# Patient Record
(demographics unavailable — no encounter records)

---

## 2024-10-15 NOTE — HISTORY OF PRESENT ILLNESS
[Fruit] : fruit [Vegetables] : vegetables [Meat] : meat [Eggs] : eggs [___ stools per day] : [unfilled]  stools per day [Normal] : Normal [Sleeps ___ hours per night] : sleeps [unfilled] hours per night [Brushing teeth twice/d] : brushing teeth twice per day [Yes] : Patient goes to dentist yearly [Premenarche] : premenarche [Playtime (60 min/d)] : playtime 60 min a day [Participates in after-school activities] : participates in after-school activities [< 2 hrs of screen time per day] : less than 2 hrs of screen time per day [Has Friends] : has friends [Has chance to make own decisions] : has chance to make own decisions [Grade ___] : Grade [unfilled] [Adequate social interactions] : adequate social interactions [Adequate behavior] : adequate behavior [Adequate performance] : adequate performance [Adequate attention] : adequate attention [No difficulties with Homework] : no difficulties with homework [Supervised outdoor play] : supervised outdoor play [Parent knows child's friends] : parent knows child's friends [Monitored computer use] : monitored computer use [Up to date] : Up to date [de-identified] : grtandmother [FreeTextEntry9] : dance [FreeTextEntry1] : This is a 9 year F here for routine exam and immunizations . parents deny any recent illnesses or ER visits

## 2024-10-15 NOTE — DISCUSSION/SUMMARY
[Normal Growth] : growth [Normal Development] : development [None] : No known medical problems [No Elimination Concerns] : elimination [No Feeding Concerns] : feeding [No Skin Concerns] : skin [Normal Sleep Pattern] : sleep [School] : school [Development and Mental Health] : development and mental health [Nutrition and Physical Activity] : nutrition and physical activity [Oral Health] : oral health [Safety] : safety [No Medications] : ~He/She~ is not on any medications [Patient] : patient [Full Activity without restrictions including Physical Education & Athletics] : Full Activity without restrictions including Physical Education & Athletics [FreeTextEntry1] : This is a  9 year  child  here for a routine examination and  immunizations.. The Child shows  growth and development from  previous exam. The  physical exam is within normal limits   she has Howard 3pubic and axillary hair .mom reach puberty at 9 years of age.  Mom states that she is 5 feet and dad is 6 '1"  3   There is minimal breast budding noted. Child has grown 1 1/2 inches since last year  will obtain bone age .  To discuss results with parents once available and as to whether or not further evaluation is indicated.   The patient  does well both academically and socially as per the  parent .  She had been dealing with social anxiety at school with bullying appears to be doing much better and has changed schools.   Advice was given on how to maintain a good healthy diet . Patient was encouraged to Continue physical activity for 1 hour a day and to limit screen time to less than 2 hrs. per day  . Immunizations were discussed and child is up to date with  her vaccinations as listed she did receive however flu vaccine today.. Patient to follow up in 1 year for routine exam and immunizations

## 2024-10-15 NOTE — PHYSICAL EXAM
[Alert] : alert [No Acute Distress] : no acute distress [Normocephalic] : normocephalic [Conjunctivae with no discharge] : conjunctivae with no discharge [PERRL] : PERRL [EOMI Bilateral] : EOMI bilateral [Auricles Well Formed] : auricles well formed [Clear Tympanic membranes with present light reflex and bony landmarks] : clear tympanic membranes with present light reflex and bony landmarks [No Discharge] : no discharge [Nares Patent] : nares patent [Pink Nasal Mucosa] : pink nasal mucosa [Palate Intact] : palate intact [Nonerythematous Oropharynx] : nonerythematous oropharynx [Supple, full passive range of motion] : supple, full passive range of motion [No Palpable Masses] : no palpable masses [Symmetric Chest Rise] : symmetric chest rise [Clear to Auscultation Bilaterally] : clear to auscultation bilaterally [Regular Rate and Rhythm] : regular rate and rhythm [Normal S1, S2 present] : normal S1, S2 present [No Murmurs] : no murmurs [+2 Femoral Pulses] : +2 femoral pulses [Soft] : soft [NonTender] : non tender [Non Distended] : non distended [Normoactive Bowel Sounds] : normoactive bowel sounds [No Hepatomegaly] : no hepatomegaly [No Splenomegaly] : no splenomegaly [Howard: _____] : Howard [unfilled] [Patent] : patent [No fissures] : no fissures [No Abnormal Lymph Nodes Palpated] : no abnormal lymph nodes palpated [No Gait Asymmetry] : no gait asymmetry [No pain or deformities with palpation of bone, muscles, joints] : no pain or deformities with palpation of bone, muscles, joints [Normal Muscle Tone] : normal muscle tone [Straight] : straight [+2 Patella DTR] : +2 patella DTR [Cranial Nerves Grossly Intact] : cranial nerves grossly intact [No Rash or Lesions] : no rash or lesions [de-identified] : slight breast budding noted on exam   [FreeTextEntry6] : pubic hair  and axillary hair